# Patient Record
Sex: FEMALE | Race: BLACK OR AFRICAN AMERICAN | Employment: PART TIME | URBAN - METROPOLITAN AREA
[De-identification: names, ages, dates, MRNs, and addresses within clinical notes are randomized per-mention and may not be internally consistent; named-entity substitution may affect disease eponyms.]

---

## 2018-03-19 ENCOUNTER — HOSPITAL ENCOUNTER (OUTPATIENT)
Age: 28
Setting detail: SPECIMEN
Discharge: HOME | End: 2018-03-19
Payer: MEDICAID

## 2018-03-19 ENCOUNTER — OFFICE VISIT (OUTPATIENT)
Dept: OBGYN CLINIC | Age: 28
End: 2018-03-19
Payer: MEDICAID

## 2018-03-19 VITALS
SYSTOLIC BLOOD PRESSURE: 114 MMHG | DIASTOLIC BLOOD PRESSURE: 79 MMHG | BODY MASS INDEX: 33.04 KG/M2 | HEART RATE: 88 BPM | HEIGHT: 70 IN | WEIGHT: 230.8 LBS

## 2018-03-19 DIAGNOSIS — B97.7 HPV IN FEMALE: ICD-10-CM

## 2018-03-19 DIAGNOSIS — R87.810 ASCUS WITH POSITIVE HIGH RISK HPV CERVICAL: Primary | ICD-10-CM

## 2018-03-19 DIAGNOSIS — Z32.02 NEGATIVE PREGNANCY TEST: ICD-10-CM

## 2018-03-19 DIAGNOSIS — R87.610 ASCUS WITH POSITIVE HIGH RISK HPV CERVICAL: Primary | ICD-10-CM

## 2018-03-19 LAB
CONTROL: PRESENT
PREGNANCY TEST URINE, POC: NEGATIVE

## 2018-03-19 PROCEDURE — 57454 BX/CURETT OF CERVIX W/SCOPE: CPT | Performed by: OBSTETRICS & GYNECOLOGY

## 2018-03-19 PROCEDURE — 81025 URINE PREGNANCY TEST: CPT | Performed by: OBSTETRICS & GYNECOLOGY

## 2018-03-19 NOTE — PROGRESS NOTES
COLPOSCOPY PROCEDURE FORM    3/19/2018  Demond Martinez  Patient's last menstrual period was 02/22/2018.  33 y.o. History   Smoking Status    Never Smoker   Smokeless Tobacco    Never Used         INDICATIONS: ASCUS                UHCG: negative         HPV:   positive  Had Gardasil    Birth Control Method: none  Abnormal Cytology and Colposcopy History: none    COLPOSCOPIC EXAMINATION:                Blood pressure 125/80, pulse 92, height 5' 10\" (1.778 m), weight 230 lb 12.8 oz (104.7 kg), last menstrual period 02/22/2018. Gross observations: prominent ectropion  Satisfactory: Yes   Unsatisfactory: No    LANDMARKS and ATYPICAL FINDINGS:  TZ = transformation zone   SC = new squamocolumnar junction  NC = Nabothian cyst   ME = immature squamous metaplasia  PO = polyp   AV = atypical vessels  C = condyloma  L = leukoplakia  AW = acetowhite epithelium   P = punctation  MO = mosaicism   LS = decreased Lugols uptake  X = biopsy sites  CA = invasive carcinoma    FINDINGS: ayptical vessels at 7 and 10am     ECC performed:  Yes    Lugols Iodine Applied:   No       IMPRESSIONS: abnormal colpo  Biopsy sites: 7oclock and ecc    PLAN: The patient was given formal restriction guidelines. She is to RTO in 2 weeks. FOR PATIENTS WHO UNDERWENT A BIOPSY-They were instructed to report any increase in vaginal bleeding, discharge, or any temperature more than 100.4 F. Strict pelvic rest precautions were reviewed with lifting restrictions.

## 2018-03-22 LAB — SURGICAL PATHOLOGY REPORT: NORMAL
